# Patient Record
Sex: FEMALE | Race: WHITE | URBAN - METROPOLITAN AREA
[De-identification: names, ages, dates, MRNs, and addresses within clinical notes are randomized per-mention and may not be internally consistent; named-entity substitution may affect disease eponyms.]

---

## 2021-05-14 ENCOUNTER — OFFICE VISIT (OUTPATIENT)
Dept: URBAN - METROPOLITAN AREA CLINIC 63 | Facility: CLINIC | Age: 66
End: 2021-05-14
Payer: MEDICAID

## 2021-05-14 DIAGNOSIS — H25.813 COMBINED FORMS OF AGE-RELATED CATARACT, BILATERAL: Primary | ICD-10-CM

## 2021-05-14 PROCEDURE — 99204 OFFICE O/P NEW MOD 45 MIN: CPT | Performed by: OPHTHALMOLOGY

## 2021-05-14 ASSESSMENT — INTRAOCULAR PRESSURE
OS: 15
OD: 16

## 2021-05-14 ASSESSMENT — VISUAL ACUITY
OS: CF
OD: 20/40

## 2021-05-14 NOTE — IMPRESSION/PLAN
Impression: Combined forms of age-related cataract, bilateral: H25.813. Plan: Cataract accounts for pt complaints. Pt desires sx. Schedule CE/IOL right eye. Risk/Benefits/Alternatives discussed with patient. Rec. mono-focal/Toric/Multi-focal. Consider ORA/LRI. RL2. Target distance. Order a-scan. Patient is a candidate for Dexycu. Discussed R/B/A. Recommend Ofloxacin or Tobramycin QID for 1 week postop. Intra-cameral Moxifloxacin to decrease the risk of infection. May elect to use combination drops or generics per patient preference.

## 2021-06-09 ENCOUNTER — TESTING ONLY (OUTPATIENT)
Dept: URBAN - METROPOLITAN AREA CLINIC 63 | Facility: CLINIC | Age: 66
End: 2021-06-09
Payer: MEDICAID

## 2021-06-09 RX ORDER — OFLOXACIN 3 MG/ML
0.3 % SOLUTION/ DROPS OPHTHALMIC
Qty: 5 | Refills: 1 | Status: INACTIVE
Start: 2021-06-09 | End: 2021-06-15

## 2021-06-09 ASSESSMENT — PACHYMETRY
OS: 22.32
OD: 22.73
OD: 2.68
OS: 2.61

## 2021-06-15 ENCOUNTER — PROCEDURE (OUTPATIENT)
Dept: URBAN - METROPOLITAN AREA SURGERY 37 | Facility: SURGERY | Age: 66
End: 2021-06-15
Payer: MEDICAID

## 2021-06-15 PROCEDURE — 66984 XCAPSL CTRC RMVL W/O ECP: CPT | Performed by: OPHTHALMOLOGY

## 2021-06-15 RX ORDER — TOBRAMYCIN 3 MG/ML
0.3 % SOLUTION/ DROPS OPHTHALMIC
Qty: 5 | Refills: 1 | Status: INACTIVE
Start: 2021-06-15 | End: 2021-10-13

## 2021-06-16 ENCOUNTER — POST-OPERATIVE VISIT (OUTPATIENT)
Dept: URBAN - METROPOLITAN AREA CLINIC 63 | Facility: CLINIC | Age: 66
End: 2021-06-16
Payer: MEDICAID

## 2021-06-16 PROCEDURE — 99024 POSTOP FOLLOW-UP VISIT: CPT | Performed by: OPTOMETRIST

## 2021-06-16 ASSESSMENT — INTRAOCULAR PRESSURE: OD: 18

## 2021-06-16 NOTE — IMPRESSION/PLAN
Impression: S/P Cataract Extraction by phacoemulsification with IOL placement OD - 1 Day. Encounter for surgical aftercare following surgery on a sense organ  Z48.580.  Plan: RTC 1 week PO2 Tobramycin Qid Od

## 2021-06-22 ENCOUNTER — POST-OPERATIVE VISIT (OUTPATIENT)
Dept: URBAN - METROPOLITAN AREA CLINIC 63 | Facility: CLINIC | Age: 66
End: 2021-06-22
Payer: MEDICAID

## 2021-06-22 PROCEDURE — 99024 POSTOP FOLLOW-UP VISIT: CPT | Performed by: OPTOMETRIST

## 2021-06-22 ASSESSMENT — INTRAOCULAR PRESSURE: OD: 16

## 2021-06-22 NOTE — IMPRESSION/PLAN
Impression: S/P Cataract Extraction by phacoemulsification with IOL placement OD - 7 Days. Encounter for surgical aftercare following surgery on a sense organ  Z48.810. Excellent post op course   Post operative instructions reviewed - Plan: RTC 3 week PO3 OD.  Dexycu

## 2021-06-29 ENCOUNTER — POST-OPERATIVE VISIT (OUTPATIENT)
Dept: URBAN - METROPOLITAN AREA CLINIC 63 | Facility: CLINIC | Age: 66
End: 2021-06-29
Payer: MEDICAID

## 2021-06-29 PROCEDURE — 99024 POSTOP FOLLOW-UP VISIT: CPT | Performed by: OPTOMETRIST

## 2021-06-29 ASSESSMENT — VISUAL ACUITY: OD: 20/30

## 2021-06-29 NOTE — IMPRESSION/PLAN
Impression: S/P Cataract Extraction by phacoemulsification with IOL placement OD - 14 Days. Encounter for surgical aftercare following surgery on a sense organ  Z48.810.  Plan: RTC 3 week PO3 Systane OU

## 2021-07-13 ENCOUNTER — POST-OPERATIVE VISIT (OUTPATIENT)
Dept: URBAN - METROPOLITAN AREA CLINIC 63 | Facility: CLINIC | Age: 66
End: 2021-07-13
Payer: MEDICAID

## 2021-07-13 DIAGNOSIS — Z48.810 ENCOUNTER FOR SURGICAL AFTERCARE FOLLOWING SURGERY ON A SENSE ORGAN: Primary | ICD-10-CM

## 2021-07-13 DIAGNOSIS — H52.4 PRESBYOPIA: ICD-10-CM

## 2021-07-13 PROCEDURE — 99024 POSTOP FOLLOW-UP VISIT: CPT | Performed by: OPTOMETRIST

## 2021-07-13 ASSESSMENT — INTRAOCULAR PRESSURE: OD: 16

## 2021-07-13 NOTE — IMPRESSION/PLAN
Impression: S/P Cataract Extraction by phacoemulsification with IOL placement OD - 28 Days. Encounter for surgical aftercare following surgery on a sense organ  Z48.810.  Plan: Manifest done today pt finished all medications

## 2021-10-13 ENCOUNTER — OFFICE VISIT (OUTPATIENT)
Dept: URBAN - METROPOLITAN AREA CLINIC 63 | Facility: CLINIC | Age: 66
End: 2021-10-13
Payer: COMMERCIAL

## 2021-10-13 DIAGNOSIS — H53.002 AMBLYOPIA OF LEFT EYE: ICD-10-CM

## 2021-10-13 DIAGNOSIS — H04.123 DRY EYE SYNDROME OF BILATERAL LACRIMAL GLANDS: Primary | ICD-10-CM

## 2021-10-13 DIAGNOSIS — Z96.1 PRESENCE OF PSEUDOPHAKIA: ICD-10-CM

## 2021-10-13 DIAGNOSIS — H25.811 COMBINED FORMS OF AGE-RELATED CATARACT, RIGHT EYE: ICD-10-CM

## 2021-10-13 PROCEDURE — 92014 COMPRE OPH EXAM EST PT 1/>: CPT | Performed by: OPTOMETRIST

## 2021-10-13 ASSESSMENT — INTRAOCULAR PRESSURE
OS: 12
OD: 14